# Patient Record
Sex: MALE
[De-identification: names, ages, dates, MRNs, and addresses within clinical notes are randomized per-mention and may not be internally consistent; named-entity substitution may affect disease eponyms.]

---

## 2023-08-17 ENCOUNTER — APPOINTMENT (OUTPATIENT)
Dept: INTERNAL MEDICINE | Facility: CLINIC | Age: 23
End: 2023-08-17
Payer: MEDICAID

## 2023-08-17 VITALS
BODY MASS INDEX: 19.76 KG/M2 | DIASTOLIC BLOOD PRESSURE: 75 MMHG | WEIGHT: 154 LBS | HEART RATE: 62 BPM | OXYGEN SATURATION: 99 % | SYSTOLIC BLOOD PRESSURE: 118 MMHG | HEIGHT: 74 IN

## 2023-08-17 DIAGNOSIS — E55.9 VITAMIN D DEFICIENCY, UNSPECIFIED: ICD-10-CM

## 2023-08-17 DIAGNOSIS — Z00.00 ENCOUNTER FOR GENERAL ADULT MEDICAL EXAMINATION W/OUT ABNORMAL FINDINGS: ICD-10-CM

## 2023-08-17 DIAGNOSIS — Z80.8 FAMILY HISTORY OF MALIGNANT NEOPLASM OF OTHER ORGANS OR SYSTEMS: ICD-10-CM

## 2023-08-17 DIAGNOSIS — Z78.9 OTHER SPECIFIED HEALTH STATUS: ICD-10-CM

## 2023-08-17 DIAGNOSIS — L21.0 SEBORRHEA CAPITIS: ICD-10-CM

## 2023-08-17 DIAGNOSIS — L70.9 ACNE, UNSPECIFIED: ICD-10-CM

## 2023-08-17 PROCEDURE — 99385 PREV VISIT NEW AGE 18-39: CPT

## 2023-08-17 RX ORDER — KETOCONAZOLE 20.5 MG/ML
2 SHAMPOO, SUSPENSION TOPICAL
Qty: 120 | Refills: 1 | Status: ACTIVE | COMMUNITY
Start: 2023-08-17 | End: 1900-01-01

## 2023-08-17 NOTE — HEALTH RISK ASSESSMENT
[Very Good] : ~his/her~  mood as very good [No] : In the past 12 months have you used drugs other than those required for medical reasons? No [No falls in past year] : Patient reported no falls in the past year [Little interest or pleasure doing things] : 1) Little interest or pleasure doing things [Feeling down, depressed, or hopeless] : 2) Feeling down, depressed, or hopeless [0] : 2) Feeling down, depressed, or hopeless: Not at all (0) [HIV test declined] : HIV test declined [Hepatitis C test declined] : Hepatitis C test declined [None] : None [With Family] : lives with family [# of Members in Household ___] :  household currently consist of [unfilled] member(s) [Employed] : employed [Student] : student [College] : College [Single] : single [# Of Children ___] : has [unfilled] children [Feels Safe at Home] : Feels safe at home [Fully functional (bathing, dressing, toileting, transferring, walking, feeding)] : Fully functional (bathing, dressing, toileting, transferring, walking, feeding) [Fully functional (using the telephone, shopping, preparing meals, housekeeping, doing laundry, using] : Fully functional and needs no help or supervision to perform IADLs (using the telephone, shopping, preparing meals, housekeeping, doing laundry, using transportation, managing medications and managing finances) [Safety elements used in home] : safety elements used in home [Seat Belt] :  uses seat belt [Patient/Caregiver not ready to engage] : , patient/caregiver not ready to engage [Never] : Never [de-identified] : no [Audit-CScore] : 0 [de-identified] : play basketball, walking [de-identified] : regular [[id number]] : 0 [Change in mental status noted] : No change in mental status noted [Language] : denies difficulty with language [Behavior] : denies difficulty with behavior [Learning/Retaining New Information] : denies difficulty learning/retaining new information [Handling Complex Tasks] : denies difficulty handling complex tasks [Spatial Ability and Orientation] : denies difficulty with spatial ability and orientation [Sexually Active] : not sexually active [Reports changes in hearing] : Reports no changes in hearing [Reports changes in vision] : Reports no changes in vision [Reports changes in dental health] : Reports no changes in dental health [Smoke Detector] : no smoke detector [Carbon Monoxide Detector] : no carbon monoxide detector [Guns at Home] : no guns at home [Sunscreen] : does not use sunscreen [TB Exposure] : is not being exposed to tuberculosis [FreeTextEntry2] : works as a   [de-identified] : wear corrective glasses

## 2023-08-17 NOTE — PLAN
[FreeTextEntry1] : Mr. ALFREDA FERNANDEZ 23 year  male  with a no significant PMH   present to the office for a physical exam.   Well adult exam is performed. Recommend  to do a blood test today, further management will depend on the blood test results.  Recommend  to do self breast exam, keep an eye on the mole on the chest For a dandruff on the scalp recommend  to use nizoral shampoo  RTC to f/u in 2 wks. Patient is verbalized understanding

## 2023-08-17 NOTE — REVIEW OF SYSTEMS
[TextEntry] : Constitutional: Denies fever, fatigue,  recent changes in the weight Head: Denies headache, dizziness Eyes: Denies diplopia, tearing or pain Ears: Denies earache, tinnitus, hearing loss Nose: Denies nasal obstruction,  epistaxis Throat: Denies throat pain Neck: Denies stiffness, muscle tenderness Chest: Denies cough, SOB, wheezing, chest congestion CV: Denies chest pain, palpitation GI: Denies abdominal pain, constipation, heartburn Genitourinary: Denies dysuria, urinary urgency Musculoskeletal: Denies joint pain Neuro: Denies changes in mental status Psychiatric: Denies depressive symptoms, change in sleep habits, changes in thought content

## 2023-08-17 NOTE — PHYSICAL EXAM
[TextEntry] : Constitutional: Well nourished, well developed, well appearing, not in acute distress Head: Normocephalic, no lesions Eyes: PERRLA, conjunctiva is NL Ear: Ear canal is normal, tympanic membrane is intact Nose: Nasal turbinates are NL Throat: Clear, no exudates, no lesions Neck: Supple, no masses Chest: Lungs are clear, no rales, no rhonchi, no wheezing Heart: Regular rate, no murmurs, no rubs, no gallops Abdomen: Soft, no tenderness, no masses, bowel sounds are normal : No CVAT Extremeties: FROM, no deformities, no edema Muskuloskeletal: has no tenderness of the spine, ROM is NL Skin: Has scar on the back due to an acne. Has small benign appearing mole on the L side of the chest Neuro: AAO x 3, no focal neurological deficit. Psychiatric: oriented to person, place, and time and insight and judgment were intact.

## 2023-08-17 NOTE — HISTORY OF PRESENT ILLNESS
[FreeTextEntry1] : New patient [de-identified] : Mr. ALFREDA FERNANDEZ 23 year  male  with a no significant PMH   present to the office for a physical exam.  Patient feels good, denies complaints at present time, except dandruffs on his scalp

## 2023-08-21 LAB
25(OH)D3 SERPL-MCNC: 19.8 NG/ML
ALBUMIN SERPL ELPH-MCNC: 4.9 G/DL
ALP BLD-CCNC: 83 U/L
ALT SERPL-CCNC: 18 U/L
ANION GAP SERPL CALC-SCNC: 12 MMOL/L
AST SERPL-CCNC: 19 U/L
BILIRUB SERPL-MCNC: 0.3 MG/DL
BUN SERPL-MCNC: 12 MG/DL
CALCIUM SERPL-MCNC: 9.9 MG/DL
CHLORIDE SERPL-SCNC: 103 MMOL/L
CHOLEST SERPL-MCNC: 173 MG/DL
CO2 SERPL-SCNC: 25 MMOL/L
CREAT SERPL-MCNC: 0.92 MG/DL
EGFR: 121 ML/MIN/1.73M2
GLUCOSE SERPL-MCNC: 95 MG/DL
HCT VFR BLD CALC: 49.7 %
HDLC SERPL-MCNC: 47 MG/DL
HGB BLD-MCNC: 15.2 G/DL
LDLC SERPL CALC-MCNC: 111 MG/DL
MCHC RBC-ENTMCNC: 24.4 PG
MCHC RBC-ENTMCNC: 30.6 GM/DL
MCV RBC AUTO: 79.8 FL
NONHDLC SERPL-MCNC: 126 MG/DL
PLATELET # BLD AUTO: 207 K/UL
POTASSIUM SERPL-SCNC: 4.8 MMOL/L
PROT SERPL-MCNC: 7.2 G/DL
RBC # BLD: 6.23 M/UL
RBC # FLD: 14.1 %
SODIUM SERPL-SCNC: 140 MMOL/L
TRIGL SERPL-MCNC: 80 MG/DL
TSH SERPL-ACNC: 1.96 UIU/ML
WBC # FLD AUTO: 4.7 K/UL

## 2023-08-21 RX ORDER — ERGOCALCIFEROL 1.25 MG/1
50000 CAPSULE ORAL
Qty: 4 | Refills: 5 | Status: ACTIVE | COMMUNITY
Start: 2023-08-21 | End: 1900-01-01

## 2024-09-03 ENCOUNTER — APPOINTMENT (OUTPATIENT)
Dept: INTERNAL MEDICINE | Facility: CLINIC | Age: 24
End: 2024-09-03
Payer: COMMERCIAL

## 2024-09-03 VITALS
OXYGEN SATURATION: 98 % | BODY MASS INDEX: 21.3 KG/M2 | SYSTOLIC BLOOD PRESSURE: 132 MMHG | WEIGHT: 166 LBS | DIASTOLIC BLOOD PRESSURE: 68 MMHG | HEIGHT: 74 IN | HEART RATE: 70 BPM

## 2024-09-03 DIAGNOSIS — E78.5 HYPERLIPIDEMIA, UNSPECIFIED: ICD-10-CM

## 2024-09-03 DIAGNOSIS — E55.9 VITAMIN D DEFICIENCY, UNSPECIFIED: ICD-10-CM

## 2024-09-03 DIAGNOSIS — L70.9 ACNE, UNSPECIFIED: ICD-10-CM

## 2024-09-03 DIAGNOSIS — Z00.00 ENCOUNTER FOR GENERAL ADULT MEDICAL EXAMINATION W/OUT ABNORMAL FINDINGS: ICD-10-CM

## 2024-09-03 LAB
BILIRUB UR QL STRIP: NEGATIVE
GLUCOSE UR-MCNC: NEGATIVE
HCG UR QL: 0.2 EU/DL
HGB UR QL STRIP.AUTO: NORMAL
KETONES UR-MCNC: NEGATIVE
LEUKOCYTE ESTERASE UR QL STRIP: NEGATIVE
NITRITE UR QL STRIP: NEGATIVE
PH UR STRIP: 6
PROT UR STRIP-MCNC: NEGATIVE
SP GR UR STRIP: >=1.03

## 2024-09-03 PROCEDURE — 81003 URINALYSIS AUTO W/O SCOPE: CPT | Mod: QW

## 2024-09-03 PROCEDURE — 99395 PREV VISIT EST AGE 18-39: CPT

## 2024-09-03 NOTE — HEALTH RISK ASSESSMENT
[Very Good] : ~his/her~ current health as very good [No] : In the past 12 months have you used drugs other than those required for medical reasons? No [No falls in past year] : Patient reported no falls in the past year [Little interest or pleasure doing things] : 1) Little interest or pleasure doing things [Feeling down, depressed, or hopeless] : 2) Feeling down, depressed, or hopeless [0] : 2) Feeling down, depressed, or hopeless: Not at all (0) [Never] : Never [HIV test declined] : HIV test declined [Hepatitis C test declined] : Hepatitis C test declined [None] : None [With Family] : lives with family [# of Members in Household ___] :  household currently consist of [unfilled] member(s) [Employed] : employed [Student] : student [College] : College [Single] : single [# Of Children ___] : has [unfilled] children [Feels Safe at Home] : Feels safe at home [Fully functional (bathing, dressing, toileting, transferring, walking, feeding)] : Fully functional (bathing, dressing, toileting, transferring, walking, feeding) [Fully functional (using the telephone, shopping, preparing meals, housekeeping, doing laundry, using] : Fully functional and needs no help or supervision to perform IADLs (using the telephone, shopping, preparing meals, housekeeping, doing laundry, using transportation, managing medications and managing finances) [Safety elements used in home] : safety elements used in home [Seat Belt] :  uses seat belt [Patient/Caregiver not ready to engage] : , patient/caregiver not ready to engage [Excellent] : ~his/her~  mood as  excellent [NO] : No [No Retinopathy] : No retinopathy [Smoke Detector] : smoke detector [Carbon Monoxide Detector] : carbon monoxide detector [de-identified] : no [de-identified] : no [Audit-CScore] : 0 [de-identified] : play basketball, walking [de-identified] : regular [[id number]] : 0 [EyeExamDate] : 08/2024 [Change in mental status noted] : No change in mental status noted [Language] : denies difficulty with language [Behavior] : denies difficulty with behavior [Learning/Retaining New Information] : denies difficulty learning/retaining new information [Handling Complex Tasks] : denies difficulty handling complex tasks [Spatial Ability and Orientation] : denies difficulty with spatial ability and orientation [Sexually Active] : not sexually active [Reports changes in hearing] : Reports no changes in hearing [Reports changes in vision] : Reports no changes in vision [Reports changes in dental health] : Reports no changes in dental health [Sunscreen] : does not use sunscreen [FreeTextEntry2] : works as a   [de-identified] : wear corrective glasses

## 2024-09-03 NOTE — PHYSICAL EXAM
[TextEntry] : Constitutional: Well developed, well appearing, not in acute distress Head: Normocephalic, no lesions Eyes: PERRLA, conjunctiva is NL Ear: Ear canal is normal, tympanic membrane is intact Nose: Nasal turbinates are NL Throat: Clear, no exudates, no lesions Neck: Supple, no masses Chest: Lungs are clear, no rales, no rhonchi, no wheezing Heart: Regular rate, no murmurs, no rubs, no gallops Abdomen: Soft, no tenderness, no masses, bowel sounds are normal : No CVAT Extremities: FROM, no deformities, no edema Musculoskeletal: has no tenderness of the spine, ROM is NL Skin: Has an acne scar on the back. Has small benign appearing mole on the L side of the chest(? extra nipple) Neuro: AAO x 3, no focal neurological deficit. Psychiatric: oriented to person, place, and time and insight and judgment were intact.

## 2024-09-03 NOTE — HISTORY OF PRESENT ILLNESS
[de-identified] : Mr. ALFREDA FERNANDEZ 24 year  male  with  PMH vitamin D deficiency, hyperlipidemia   present to the office for a physical exam.  Patient feels good, denies complaints at present time.

## 2024-09-03 NOTE — PLAN
[FreeTextEntry1] : Mr. ALFREDA FERNANDEZ 24 year  male  with  PMH vitamin D deficiency, hyperlipidemia   present to the office for a physical exam.   Well adult exam is performed. Recommend  to do a blood test today, further management will depend on the blood test results.  Recommend  to f/u with dermatologist - skin exam Urine dipstick is done, negative  RTC to f/u in 2 wks. Patient is verbalized understanding

## 2024-09-04 LAB
25(OH)D3 SERPL-MCNC: 21 NG/ML
ALBUMIN SERPL ELPH-MCNC: 5 G/DL
ALP BLD-CCNC: 95 U/L
ALT SERPL-CCNC: 22 U/L
ANION GAP SERPL CALC-SCNC: 14 MMOL/L
AST SERPL-CCNC: 25 U/L
BASOPHILS # BLD AUTO: 0.03 K/UL
BASOPHILS NFR BLD AUTO: 0.4 %
BILIRUB SERPL-MCNC: 0.3 MG/DL
BUN SERPL-MCNC: 15 MG/DL
CALCIUM SERPL-MCNC: 9.9 MG/DL
CHLORIDE SERPL-SCNC: 101 MMOL/L
CHOLEST SERPL-MCNC: 200 MG/DL
CO2 SERPL-SCNC: 24 MMOL/L
CREAT SERPL-MCNC: 0.95 MG/DL
EGFR: 115 ML/MIN/1.73M2
EOSINOPHIL # BLD AUTO: 0.17 K/UL
EOSINOPHIL NFR BLD AUTO: 2.2 %
GLUCOSE SERPL-MCNC: 92 MG/DL
HCT VFR BLD CALC: 48.5 %
HDLC SERPL-MCNC: 45 MG/DL
HGB BLD-MCNC: 14.8 G/DL
IMM GRANULOCYTES NFR BLD AUTO: 0.1 %
IRON SATN MFR SERPL: 23 %
IRON SERPL-MCNC: 84 UG/DL
LDLC SERPL CALC-MCNC: 128 MG/DL
LYMPHOCYTES # BLD AUTO: 2.19 K/UL
LYMPHOCYTES NFR BLD AUTO: 28 %
MAN DIFF?: NORMAL
MCHC RBC-ENTMCNC: 23.6 PG
MCHC RBC-ENTMCNC: 30.5 GM/DL
MCV RBC AUTO: 77.4 FL
MONOCYTES # BLD AUTO: 0.55 K/UL
MONOCYTES NFR BLD AUTO: 7 %
NEUTROPHILS # BLD AUTO: 4.88 K/UL
NEUTROPHILS NFR BLD AUTO: 62.3 %
NONHDLC SERPL-MCNC: 155 MG/DL
PLATELET # BLD AUTO: 230 K/UL
POTASSIUM SERPL-SCNC: 4.3 MMOL/L
PROT SERPL-MCNC: 7.7 G/DL
RBC # BLD: 6.27 M/UL
RBC # FLD: 13.5 %
SODIUM SERPL-SCNC: 139 MMOL/L
TIBC SERPL-MCNC: 358 UG/DL
TRIGL SERPL-MCNC: 149 MG/DL
TSH SERPL-ACNC: 3.27 UIU/ML
UIBC SERPL-MCNC: 274 UG/DL
WBC # FLD AUTO: 7.83 K/UL